# Patient Record
Sex: FEMALE | URBAN - METROPOLITAN AREA
[De-identification: names, ages, dates, MRNs, and addresses within clinical notes are randomized per-mention and may not be internally consistent; named-entity substitution may affect disease eponyms.]

---

## 2021-10-08 ENCOUNTER — OFFICE VISIT (OUTPATIENT)
Dept: URBAN - METROPOLITAN AREA CLINIC 33 | Facility: CLINIC | Age: 21
End: 2021-10-08

## 2021-10-08 VITALS
BODY MASS INDEX: 38.41 KG/M2 | HEIGHT: 64 IN | SYSTOLIC BLOOD PRESSURE: 125 MMHG | OXYGEN SATURATION: 98 % | HEART RATE: 80 BPM | DIASTOLIC BLOOD PRESSURE: 80 MMHG | WEIGHT: 225 LBS

## 2021-10-08 RX ORDER — ONDANSETRON 8 MG/1
1 TABLET ON THE TONGUE AND ALLOW TO DISSOLVE  AS NEEDED TABLET, ORALLY DISINTEGRATING ORAL ONCE A DAY
Qty: 30 | Status: ACTIVE | COMMUNITY

## 2021-10-08 RX ORDER — DOXYCYCLINE HYCLATE 100 MG/1
1 CAPSULE CAPSULE ORAL ONCE A DAY
Qty: 10 | Status: ON HOLD | COMMUNITY

## 2021-10-08 RX ORDER — SODIUM, POTASSIUM,MAG SULFATES 17.5-3.13G
ML AS DIRECTED SOLUTION, RECONSTITUTED, ORAL ORAL
Qty: 1 KIT | Refills: 0 | OUTPATIENT
Start: 2021-10-08

## 2021-10-08 RX ORDER — DESOGESTREL AND ETHINYL ESTRADIOL 0.15-0.03
1 TABLET KIT ORAL ONCE A DAY
Qty: 28 | Status: ACTIVE | COMMUNITY

## 2021-10-08 RX ORDER — DICYCLOMINE HYDROCHLORIDE 10 MG/1
2 CAPSULES CAPSULE ORAL THREE TIMES A DAY
Qty: 180 | Status: ACTIVE | COMMUNITY

## 2021-10-08 NOTE — HPI-MIGRATED HPI
;   ;     Nausea/Vomiting : 20 y/o female patient admits recent onset of nausea.  The onset was 4 weeks ago. Patient admits  to associated symptoms such as vomiting,  loss of appetite,  diarrhea,  abdominal pain, bloating, gas.  Patient admits taking Zofran 8 mg as needed with some relief of symptoms.   Patient states that she was experiencing alternating bowel movements between diarrhea and formed stools with extensive amount of mucus.   Patient states that she has been taking Imodium and Pepto prn with little relief. Patient states that she has been prescribed Bentyl 10 mg one week ago, and her symptoms have subsided.  Patient currently admits 1 formed bowel movements per day. Patient denies blood, mucus, or melena in stools.  Patient states that their previous bowel habits were never normal.   Patient denies hematemesis, coffee-ground emesis, melena, or rectal bleeding.   Patient denies past EGD or colonoscopy. Patient denies family hx of gastric/esophageal cancer or diseases. ;   Change in bowel habits : 20 y/o female patient admits recent onset of change in bowel habits. The onset was 4 weeks ago.  Patient notes that she recently went to the urgent care with severe abdominal pain. Patient admits associated symptoms such as bloating, gas, nausea, vomiting, and early satiety.    Patient states that she was experiencing alternating bowel movements between diarrhea and formed stools with extensive amount of mucus.   Patient states that she has been taking Imodium and Pepto prn with little relief. Patient states that she has been prescribed Bentyl 10 mg one week ago, and her symptoms have subsided.  Patient currently admits 1 formed bowel movements per day. Patient denies blood, mucus, or melena in stools.  Patient states that their previous bowel habits were never normal.   CT abd (09/29/2021) shows mesenteric adenitis and accidental findings of focally nodular pneumonia. Patient admits that she had just finished off taking Doxycycline 10 mg to treat her pneumonia.   Patient denies having a colonoscopy in past.   Patient admits/denies family hx of colonic disease/cancer/polyps. ;

## 2021-10-13 ENCOUNTER — WEB ENCOUNTER (OUTPATIENT)
Dept: URBAN - METROPOLITAN AREA CLINIC 35 | Facility: CLINIC | Age: 21
End: 2021-10-13

## 2021-10-13 RX ORDER — ONDANSETRON HYDROCHLORIDE 8 MG/1
1 TABLET AS NEEDED TABLET, FILM COATED ORAL ONCE A DAY
Qty: 30 | Refills: 4 | OUTPATIENT
Start: 2021-10-14

## 2021-10-14 ENCOUNTER — TELEPHONE ENCOUNTER (OUTPATIENT)
Dept: URBAN - METROPOLITAN AREA CLINIC 35 | Facility: CLINIC | Age: 21
End: 2021-10-14

## 2021-11-16 ENCOUNTER — TELEPHONE ENCOUNTER (OUTPATIENT)
Dept: URBAN - METROPOLITAN AREA CLINIC 35 | Facility: CLINIC | Age: 21
End: 2021-11-16

## 2021-11-16 RX ORDER — SOD SULF/POT CHLORIDE/MAG SULF 1.479 G
12 TABLETS TABLET ORAL BID
Qty: 24 | Refills: 0 | OUTPATIENT
Start: 2021-11-16

## 2021-11-22 ENCOUNTER — OFFICE VISIT (OUTPATIENT)
Dept: URBAN - METROPOLITAN AREA SURGERY CENTER 8 | Facility: SURGERY CENTER | Age: 21
End: 2021-11-22

## 2021-11-23 ENCOUNTER — OFFICE VISIT (OUTPATIENT)
Dept: URBAN - METROPOLITAN AREA SURGERY CENTER 8 | Facility: SURGERY CENTER | Age: 21
End: 2021-11-23

## 2021-11-29 ENCOUNTER — OFFICE VISIT (OUTPATIENT)
Dept: URBAN - METROPOLITAN AREA CLINIC 78 | Facility: CLINIC | Age: 21
End: 2021-11-29

## 2021-12-06 ENCOUNTER — OFFICE VISIT (OUTPATIENT)
Dept: URBAN - METROPOLITAN AREA CLINIC 33 | Facility: CLINIC | Age: 21
End: 2021-12-06

## 2021-12-08 ENCOUNTER — OFFICE VISIT (OUTPATIENT)
Dept: URBAN - METROPOLITAN AREA CLINIC 35 | Facility: CLINIC | Age: 21
End: 2021-12-08

## 2021-12-22 ENCOUNTER — DASHBOARD ENCOUNTERS (OUTPATIENT)
Age: 21
End: 2021-12-22

## 2021-12-22 ENCOUNTER — OFFICE VISIT (OUTPATIENT)
Dept: URBAN - METROPOLITAN AREA CLINIC 35 | Facility: CLINIC | Age: 21
End: 2021-12-22
Payer: SELF-PAY

## 2021-12-22 VITALS
WEIGHT: 225 LBS | SYSTOLIC BLOOD PRESSURE: 120 MMHG | HEART RATE: 86 BPM | BODY MASS INDEX: 38.41 KG/M2 | HEIGHT: 64 IN | DIASTOLIC BLOOD PRESSURE: 80 MMHG | OXYGEN SATURATION: 98 %

## 2021-12-22 DIAGNOSIS — K58.2 IRRITABLE BOWEL SYNDROME WITH CONSTIPATION AND DIARRHEA: ICD-10-CM

## 2021-12-22 DIAGNOSIS — K21.9 GASTROESOPHAGEAL REFLUX DISEASE WITHOUT ESOPHAGITIS: ICD-10-CM

## 2021-12-22 DIAGNOSIS — R14.0 ABDOMINAL BLOATING: ICD-10-CM

## 2021-12-22 DIAGNOSIS — R11.0 NAUSEA: ICD-10-CM

## 2021-12-22 PROBLEM — 266435005: Status: ACTIVE | Noted: 2021-12-22

## 2021-12-22 PROBLEM — 10743008: Status: ACTIVE | Noted: 2021-12-22

## 2021-12-22 PROBLEM — 440630006: Status: ACTIVE | Noted: 2021-12-22

## 2021-12-22 PROCEDURE — 99214 OFFICE O/P EST MOD 30 MIN: CPT | Performed by: INTERNAL MEDICINE

## 2021-12-22 RX ORDER — DICYCLOMINE HYDROCHLORIDE 10 MG/1
2 CAPSULES CAPSULE ORAL THREE TIMES A DAY
Qty: 180 | Status: ACTIVE | COMMUNITY

## 2021-12-22 RX ORDER — RABEPRAZOLE SODIUM 20 MG/1
1 TABLET TABLET, DELAYED RELEASE ORAL
Qty: 90 | Refills: 1 | OUTPATIENT
Start: 2021-12-22

## 2021-12-22 RX ORDER — ONDANSETRON HYDROCHLORIDE 8 MG/1
1 TABLET AS NEEDED TABLET, FILM COATED ORAL ONCE A DAY
Qty: 30 | Refills: 4 | Status: ON HOLD | COMMUNITY
Start: 2021-10-14

## 2021-12-22 RX ORDER — SOD SULF/POT CHLORIDE/MAG SULF 1.479 G
12 TABLETS TABLET ORAL BID
Qty: 24 | Refills: 0 | Status: ON HOLD | COMMUNITY
Start: 2021-11-16

## 2021-12-22 RX ORDER — SODIUM, POTASSIUM,MAG SULFATES 17.5-3.13G
ML AS DIRECTED SOLUTION, RECONSTITUTED, ORAL ORAL
Qty: 1 KIT | Refills: 0 | Status: ON HOLD | COMMUNITY
Start: 2021-10-08

## 2021-12-22 RX ORDER — ONDANSETRON 8 MG/1
1 TABLET ON THE TONGUE AND ALLOW TO DISSOLVE  AS NEEDED TABLET, ORALLY DISINTEGRATING ORAL ONCE A DAY
Qty: 30 | Status: ACTIVE | COMMUNITY

## 2021-12-22 RX ORDER — DOXYCYCLINE HYCLATE 100 MG/1
1 CAPSULE CAPSULE ORAL ONCE A DAY
Qty: 10 | Status: ON HOLD | COMMUNITY

## 2021-12-22 RX ORDER — DESOGESTREL AND ETHINYL ESTRADIOL 0.15-0.03
1 TABLET KIT ORAL ONCE A DAY
Qty: 28 | Status: ACTIVE | COMMUNITY

## 2021-12-22 NOTE — HPI-CHANGE IN BOWEL HABITS
Patient presents today to review EGD/Colon completed on 11.22.21. She denies any complications following the procedure. Patient was previously seen in clinic on 10/8/2021 by Dr. Arden Rucker with complaints of generalized abdominal that radiated to her back. Associated symptoms include diarrhea (more than 3 times a day bleeding was present in the toilet, and paper. She also had mucus. This lasted for 2 weeks. She was seen PCP office who ordered CT of the abdomen, and was started on Bentyl.   2-3 BMs per week; she will go 2-3 days without a BM with painful gas, and then she is able to have a BM that is hard followed by loose stools. She denies taking any medications for bowel movements. She does not feel like she is completely emptying her bowels.   Prior BMs were 3-4 a week.  Nausea (she is taking Zofran 8 mg) and occasional emesis.  No weight loss Nasal drainage when she eats  She has  tried gas x without any relief.   CT ABD (09/29/2021) shows mesenteric adenitis and accidental findings of focally nodular pneumonia. Patient admits that she had just finished off taking Doxycycline 10 mg to treat her pneumonia.   Labs 9/28/21 HGB: 12.5 HCT: 38.1 MCV: 77.6 MCH: 25.5 PLT: 385  Lipase: 16 AST: 13 ALT: 15 ALK: 70 Albumin: 3.9 Creat: 0.67   EGD, colonoscopy and path documented below.

## 2021-12-22 NOTE — PHYSICAL EXAM HENT:
Head,  normocephalic,  atraumatic,  Face,  Face within normal limits,  Ears,  External ears within normal limits,  Nose/Nasopharynx,  External nose  normal appearance,  nares patent,  no nasal discharge,  Mouth and Throat,  Oral cavity appearance normal, ,  Lips,  Appearance normal

## 2022-01-05 ENCOUNTER — TELEPHONE ENCOUNTER (OUTPATIENT)
Dept: URBAN - METROPOLITAN AREA CLINIC 36 | Facility: CLINIC | Age: 22
End: 2022-01-05

## 2022-02-25 ENCOUNTER — OFFICE VISIT (OUTPATIENT)
Dept: URBAN - METROPOLITAN AREA CLINIC 31 | Facility: CLINIC | Age: 22
End: 2022-02-25

## 2022-04-08 ENCOUNTER — OFFICE VISIT (OUTPATIENT)
Dept: URBAN - METROPOLITAN AREA CLINIC 31 | Facility: CLINIC | Age: 22
End: 2022-04-08

## 2022-04-22 ENCOUNTER — OFFICE VISIT (OUTPATIENT)
Dept: URBAN - METROPOLITAN AREA CLINIC 31 | Facility: CLINIC | Age: 22
End: 2022-04-22